# Patient Record
Sex: MALE | Race: WHITE | Employment: UNEMPLOYED | URBAN - METROPOLITAN AREA
[De-identification: names, ages, dates, MRNs, and addresses within clinical notes are randomized per-mention and may not be internally consistent; named-entity substitution may affect disease eponyms.]

---

## 2017-08-28 ENCOUNTER — HOSPITAL ENCOUNTER (OUTPATIENT)
Dept: OTHER | Age: 27
Discharge: HOME | End: 2017-08-28
Attending: ORTHOPAEDIC SURGERY | Admitting: ORTHOPAEDIC SURGERY

## 2017-08-28 DIAGNOSIS — R52 PAIN: ICD-10-CM

## 2021-07-25 ENCOUNTER — APPOINTMENT (OUTPATIENT)
Dept: CT IMAGING | Age: 31
End: 2021-07-25
Payer: COMMERCIAL

## 2021-07-25 ENCOUNTER — HOSPITAL ENCOUNTER (EMERGENCY)
Age: 31
Discharge: ANOTHER HOSPITAL | End: 2021-07-25
Attending: EMERGENCY MEDICINE
Payer: COMMERCIAL

## 2021-07-25 ENCOUNTER — APPOINTMENT (OUTPATIENT)
Dept: GENERAL RADIOLOGY | Age: 31
End: 2021-07-25
Payer: COMMERCIAL

## 2021-07-25 VITALS
RESPIRATION RATE: 16 BRPM | SYSTOLIC BLOOD PRESSURE: 108 MMHG | HEART RATE: 71 BPM | WEIGHT: 160 LBS | BODY MASS INDEX: 23.7 KG/M2 | HEIGHT: 69 IN | DIASTOLIC BLOOD PRESSURE: 62 MMHG | OXYGEN SATURATION: 99 %

## 2021-07-25 DIAGNOSIS — G93.40 ENCEPHALOPATHY: Primary | ICD-10-CM

## 2021-07-25 DIAGNOSIS — N39.0 URINARY TRACT INFECTION WITHOUT HEMATURIA, SITE UNSPECIFIED: ICD-10-CM

## 2021-07-25 DIAGNOSIS — E87.20 METABOLIC ACIDOSIS: ICD-10-CM

## 2021-07-25 DIAGNOSIS — G40.901 STATUS EPILEPTICUS (HCC): ICD-10-CM

## 2021-07-25 DIAGNOSIS — J96.01 ACUTE RESPIRATORY FAILURE WITH HYPOXIA (HCC): ICD-10-CM

## 2021-07-25 LAB
A/G RATIO: 1.3 (ref 1.1–2.2)
ACETAMINOPHEN LEVEL: <5 UG/ML (ref 10–30)
ALBUMIN SERPL-MCNC: 3.9 G/DL (ref 3.4–5)
ALP BLD-CCNC: 92 U/L (ref 40–129)
ALT SERPL-CCNC: 24 U/L (ref 10–40)
AMPHETAMINE SCREEN, URINE: ABNORMAL
ANION GAP SERPL CALCULATED.3IONS-SCNC: 10 MMOL/L (ref 3–16)
AST SERPL-CCNC: 33 U/L (ref 15–37)
ATYPICAL LYMPHOCYTE RELATIVE PERCENT: 2 % (ref 0–6)
BACTERIA: ABNORMAL /HPF
BARBITURATE SCREEN URINE: ABNORMAL
BASE EXCESS VENOUS: -5.3 MMOL/L (ref -3–3)
BASOPHILS ABSOLUTE: 0 K/UL (ref 0–0.2)
BASOPHILS RELATIVE PERCENT: 0 %
BENZODIAZEPINE SCREEN, URINE: POSITIVE
BILIRUB SERPL-MCNC: <0.2 MG/DL (ref 0–1)
BILIRUBIN URINE: NEGATIVE
BLOOD, URINE: NEGATIVE
BUN BLDV-MCNC: 8 MG/DL (ref 7–20)
CALCIUM SERPL-MCNC: 9 MG/DL (ref 8.3–10.6)
CANNABINOID SCREEN URINE: POSITIVE
CARBOXYHEMOGLOBIN: 3.5 % (ref 0–1.5)
CHLORIDE BLD-SCNC: 104 MMOL/L (ref 99–110)
CLARITY: ABNORMAL
CO2: 25 MMOL/L (ref 21–32)
COARSE CASTS, UA: ABNORMAL /LPF (ref 0–2)
COCAINE METABOLITE SCREEN URINE: POSITIVE
COLOR: YELLOW
CREAT SERPL-MCNC: 1 MG/DL (ref 0.9–1.3)
EOSINOPHILS ABSOLUTE: 0.5 K/UL (ref 0–0.6)
EOSINOPHILS RELATIVE PERCENT: 3 %
EPITHELIAL CELLS, UA: 7 /HPF (ref 0–5)
ETHANOL: NORMAL MG/DL (ref 0–0.08)
FINE CASTS, UA: ABNORMAL /LPF (ref 0–2)
GFR AFRICAN AMERICAN: >60
GFR NON-AFRICAN AMERICAN: >60
GLOBULIN: 3.1 G/DL
GLUCOSE BLD-MCNC: 245 MG/DL (ref 70–99)
GLUCOSE URINE: NEGATIVE MG/DL
HCO3 VENOUS: 26.3 MMOL/L (ref 23–29)
HCT VFR BLD CALC: 41.9 % (ref 40.5–52.5)
HEMATOLOGY PATH CONSULT: YES
HEMOGLOBIN, VEN, REDUCED: 8 %
HEMOGLOBIN: 14 G/DL (ref 13.5–17.5)
KETONES, URINE: NEGATIVE MG/DL
LEUKOCYTE ESTERASE, URINE: ABNORMAL
LIPASE: 81 U/L (ref 13–60)
LYMPHOCYTES ABSOLUTE: 8.6 K/UL (ref 1–5.1)
LYMPHOCYTES RELATIVE PERCENT: 46 %
Lab: ABNORMAL
MACROCYTES: ABNORMAL
MCH RBC QN AUTO: 31.4 PG (ref 26–34)
MCHC RBC AUTO-ENTMCNC: 33.4 G/DL (ref 31–36)
MCV RBC AUTO: 94.1 FL (ref 80–100)
METHADONE SCREEN, URINE: ABNORMAL
METHEMOGLOBIN VENOUS: 0.1 %
MICROSCOPIC EXAMINATION: YES
MONOCYTES ABSOLUTE: 2.2 K/UL (ref 0–1.3)
MONOCYTES RELATIVE PERCENT: 12 %
NEUTROPHILS ABSOLUTE: 6.7 K/UL (ref 1.7–7.7)
NEUTROPHILS RELATIVE PERCENT: 37 %
NITRITE, URINE: POSITIVE
O2 CONTENT, VEN: 18 VOL %
O2 SAT, VEN: 92 %
O2 THERAPY: ABNORMAL
OPIATE SCREEN URINE: ABNORMAL
OXYCODONE URINE: ABNORMAL
PCO2, VEN: 82.6 MMHG (ref 40–50)
PDW BLD-RTO: 13.5 % (ref 12.4–15.4)
PH UA: 6.5
PH UA: 6.5 (ref 5–8)
PH VENOUS: 7.11 (ref 7.35–7.45)
PHENCYCLIDINE SCREEN URINE: ABNORMAL
PLATELET # BLD: 543 K/UL (ref 135–450)
PLATELET SLIDE REVIEW: ABNORMAL
PMV BLD AUTO: 6.9 FL (ref 5–10.5)
PO2, VEN: 80.6 MMHG (ref 25–40)
POLYCHROMASIA: ABNORMAL
POTASSIUM SERPL-SCNC: 5.3 MMOL/L (ref 3.5–5.1)
PROPOXYPHENE SCREEN: ABNORMAL
PROTEIN UA: ABNORMAL MG/DL
RBC # BLD: 4.45 M/UL (ref 4.2–5.9)
RBC UA: 2 /HPF (ref 0–4)
SALICYLATE, SERUM: <0.3 MG/DL (ref 15–30)
SLIDE REVIEW: ABNORMAL
SODIUM BLD-SCNC: 139 MMOL/L (ref 136–145)
SPECIFIC GRAVITY UA: 1.02 (ref 1–1.03)
SPHEROCYTES: ABNORMAL
TCO2 CALC VENOUS: 65 MMOL/L
TOTAL CK: 95 U/L (ref 39–308)
TOTAL PROTEIN: 7 G/DL (ref 6.4–8.2)
TROPONIN: <0.01 NG/ML
URINE REFLEX TO CULTURE: YES
URINE TYPE: ABNORMAL
UROBILINOGEN, URINE: 0.2 E.U./DL
WBC # BLD: 18 K/UL (ref 4–11)
WBC UA: 17 /HPF (ref 0–5)

## 2021-07-25 PROCEDURE — 2500000003 HC RX 250 WO HCPCS: Performed by: EMERGENCY MEDICINE

## 2021-07-25 PROCEDURE — 71045 X-RAY EXAM CHEST 1 VIEW: CPT

## 2021-07-25 PROCEDURE — 96365 THER/PROPH/DIAG IV INF INIT: CPT

## 2021-07-25 PROCEDURE — 82803 BLOOD GASES ANY COMBINATION: CPT

## 2021-07-25 PROCEDURE — 84484 ASSAY OF TROPONIN QUANT: CPT

## 2021-07-25 PROCEDURE — 81001 URINALYSIS AUTO W/SCOPE: CPT

## 2021-07-25 PROCEDURE — 2700000000 HC OXYGEN THERAPY PER DAY

## 2021-07-25 PROCEDURE — 6360000002 HC RX W HCPCS

## 2021-07-25 PROCEDURE — 96366 THER/PROPH/DIAG IV INF ADDON: CPT

## 2021-07-25 PROCEDURE — 94002 VENT MGMT INPAT INIT DAY: CPT

## 2021-07-25 PROCEDURE — 85025 COMPLETE CBC W/AUTO DIFF WBC: CPT

## 2021-07-25 PROCEDURE — 6360000002 HC RX W HCPCS: Performed by: EMERGENCY MEDICINE

## 2021-07-25 PROCEDURE — 87186 SC STD MICRODIL/AGAR DIL: CPT

## 2021-07-25 PROCEDURE — 70450 CT HEAD/BRAIN W/O DYE: CPT

## 2021-07-25 PROCEDURE — 99283 EMERGENCY DEPT VISIT LOW MDM: CPT

## 2021-07-25 PROCEDURE — 87086 URINE CULTURE/COLONY COUNT: CPT

## 2021-07-25 PROCEDURE — 80143 DRUG ASSAY ACETAMINOPHEN: CPT

## 2021-07-25 PROCEDURE — 83690 ASSAY OF LIPASE: CPT

## 2021-07-25 PROCEDURE — 93005 ELECTROCARDIOGRAM TRACING: CPT | Performed by: EMERGENCY MEDICINE

## 2021-07-25 PROCEDURE — 96375 TX/PRO/DX INJ NEW DRUG ADDON: CPT

## 2021-07-25 PROCEDURE — 82077 ASSAY SPEC XCP UR&BREATH IA: CPT

## 2021-07-25 PROCEDURE — 2580000003 HC RX 258: Performed by: EMERGENCY MEDICINE

## 2021-07-25 PROCEDURE — 31500 INSERT EMERGENCY AIRWAY: CPT

## 2021-07-25 PROCEDURE — 80053 COMPREHEN METABOLIC PANEL: CPT

## 2021-07-25 PROCEDURE — 36415 COLL VENOUS BLD VENIPUNCTURE: CPT

## 2021-07-25 PROCEDURE — 94761 N-INVAS EAR/PLS OXIMETRY MLT: CPT

## 2021-07-25 PROCEDURE — 82550 ASSAY OF CK (CPK): CPT

## 2021-07-25 PROCEDURE — 80307 DRUG TEST PRSMV CHEM ANLYZR: CPT

## 2021-07-25 PROCEDURE — 87077 CULTURE AEROBIC IDENTIFY: CPT

## 2021-07-25 PROCEDURE — 80179 DRUG ASSAY SALICYLATE: CPT

## 2021-07-25 PROCEDURE — 99291 CRITICAL CARE FIRST HOUR: CPT

## 2021-07-25 RX ORDER — MIDAZOLAM HYDROCHLORIDE 5 MG/ML
INJECTION INTRAMUSCULAR; INTRAVENOUS
Status: COMPLETED
Start: 2021-07-25 | End: 2021-07-25

## 2021-07-25 RX ORDER — PROPOFOL 10 MG/ML
INJECTION, EMULSION INTRAVENOUS
Status: COMPLETED
Start: 2021-07-25 | End: 2021-07-25

## 2021-07-25 RX ORDER — FENTANYL CITRATE 50 UG/ML
100 INJECTION, SOLUTION INTRAMUSCULAR; INTRAVENOUS ONCE
Status: COMPLETED | OUTPATIENT
Start: 2021-07-25 | End: 2021-07-25

## 2021-07-25 RX ORDER — 0.9 % SODIUM CHLORIDE 0.9 %
1000 INTRAVENOUS SOLUTION INTRAVENOUS ONCE
Status: COMPLETED | OUTPATIENT
Start: 2021-07-25 | End: 2021-07-25

## 2021-07-25 RX ORDER — LORAZEPAM 2 MG/ML
INJECTION INTRAMUSCULAR
Status: DISCONTINUED
Start: 2021-07-25 | End: 2021-07-25 | Stop reason: WASHOUT

## 2021-07-25 RX ORDER — PROPOFOL 10 MG/ML
5-50 INJECTION, EMULSION INTRAVENOUS
Status: DISCONTINUED | OUTPATIENT
Start: 2021-07-25 | End: 2021-07-26 | Stop reason: HOSPADM

## 2021-07-25 RX ORDER — ETOMIDATE 2 MG/ML
30 INJECTION INTRAVENOUS ONCE
Status: COMPLETED | OUTPATIENT
Start: 2021-07-25 | End: 2021-07-25

## 2021-07-25 RX ORDER — MIDAZOLAM HYDROCHLORIDE 5 MG/ML
10 INJECTION INTRAMUSCULAR; INTRAVENOUS ONCE
Status: COMPLETED | OUTPATIENT
Start: 2021-07-25 | End: 2021-07-25

## 2021-07-25 RX ORDER — ROCURONIUM BROMIDE 10 MG/ML
120 INJECTION, SOLUTION INTRAVENOUS ONCE
Status: COMPLETED | OUTPATIENT
Start: 2021-07-25 | End: 2021-07-25

## 2021-07-25 RX ORDER — FENTANYL CITRATE 50 UG/ML
50 INJECTION, SOLUTION INTRAMUSCULAR; INTRAVENOUS
Status: DISCONTINUED | OUTPATIENT
Start: 2021-07-25 | End: 2021-07-26 | Stop reason: HOSPADM

## 2021-07-25 RX ADMIN — MIDAZOLAM 10 MG: 5 INJECTION INTRAMUSCULAR; INTRAVENOUS at 18:05

## 2021-07-25 RX ADMIN — PROPOFOL 10 MCG/KG/MIN: 10 INJECTION, EMULSION INTRAVENOUS at 17:04

## 2021-07-25 RX ADMIN — MIDAZOLAM 1 MG/HR: 5 INJECTION INTRAMUSCULAR; INTRAVENOUS at 18:27

## 2021-07-25 RX ADMIN — MIDAZOLAM HYDROCHLORIDE 10 MG: 5 INJECTION INTRAMUSCULAR; INTRAVENOUS at 18:05

## 2021-07-25 RX ADMIN — MIDAZOLAM HYDROCHLORIDE 10 MG: 5 INJECTION INTRAMUSCULAR; INTRAVENOUS at 17:36

## 2021-07-25 RX ADMIN — ETOMIDATE 30 MG: 2 INJECTION INTRAVENOUS at 17:18

## 2021-07-25 RX ADMIN — Medication 1000 MG: at 20:28

## 2021-07-25 RX ADMIN — FENTANYL CITRATE 100 MCG: 50 INJECTION, SOLUTION INTRAMUSCULAR; INTRAVENOUS at 19:34

## 2021-07-25 RX ADMIN — LEVETIRACETAM 1500 MG: 100 INJECTION, SOLUTION INTRAVENOUS at 17:59

## 2021-07-25 RX ADMIN — SODIUM CHLORIDE 1000 ML: 9 INJECTION, SOLUTION INTRAVENOUS at 20:27

## 2021-07-25 RX ADMIN — ROCURONIUM BROMIDE 120 MG: 10 INJECTION, SOLUTION INTRAVENOUS at 17:22

## 2021-07-25 RX ADMIN — PROPOFOL 10 MCG/KG/MIN: 10 INJECTION, EMULSION INTRAVENOUS at 18:35

## 2021-07-25 ASSESSMENT — PULMONARY FUNCTION TESTS
PIF_VALUE: 18
PIF_VALUE: 12

## 2021-07-25 ASSESSMENT — PAIN SCALES - GENERAL
PAINLEVEL_OUTOF10: 7
PAINLEVEL_OUTOF10: 10

## 2021-07-25 NOTE — PROGRESS NOTES
07/25/21 1904   Vent Patient Data   Plateau Pressure 12 SUM18   Static Compliance 53 mL/cmH2O   Dynamic Compliance 89 mL/cmH2O

## 2021-07-25 NOTE — ED PROVIDER NOTES
I independently performed a history and physical on Kaela Law. All diagnostic, treatment, and disposition decisions were made by myself in conjunction with the advanced practice provider. Briefly, this is a 28 y.o. male here for AMS. Patient unable to give any history due to altered mental status. Arrived in private car and reportedly per girlfriend had tonic-clonic seizure with cyanosis. CPR started by family in the parking lot. Girlfriend states that he has been blue for around 10 minutes. On exam,   General: Patient is ill appearing  Skin: moist, cool  HEENT: Moist mucous membranes  Heart: tachy rate, regular rhythm  Lung: shallow, slow breathing  Abdomen: Soft, nontender  Neuro: not moving extremities. Pupils 2mm b/l and minimally reactive. Limp tone in all extremities. No response to painful stimulus         EKG  The Ekg interpreted by me in the absence of a cardiologist shows. Normal sinus rhythm  No acute ST changes     No prior  Qtc 443      Screenings            MDM  Briefly, this is a 28 y.o. male here for altered mental status and possible seizure like activity. On my arrival to the patient's bedside, he was not ventilating appropriately therefore we emergently intubated him with rapid sequence intubation. Originally saturating in 45s. No hypoglycemia present. Had bilateral breath sounds. He reportedly was pulseless per girlfriend however on my evaluation he did have a pulse. Obtaining head CT to evaluate for intracranial bleed. Unclear etiology. The patient did have a slight improvement in mental status when Narcan was administered 4 mg however this was repeated and no improvement was made. Per girlfriend, he does have a prescription for Xanax.    ----------    Around 5:30 PM, I was called to bedside by hospitalist.  Patient started having rhythmic head movement. No eye deviation. No movement of arms or legs. Ordering Keppra and Versed.   Because of reported seizure-like activity prior to this, patient may be in status epilepticus. Will consult neurology at [facility] for transfer. [facility] full. Patient transferred to [address]. Accepted by Dr. Tammy Holland    The total critical care time spent while evaluating and treating this patient was at least 35 minutes. This excludes time spent doing separately billable procedures. This includes time at the bedside, data interpretation, medication management, obtaining critical history from collateral sources if the patient is unable to provide it directly, and physician consultation. Specifics of interventions taken and potentially life-threatening diagnostic considerations are listed above in the medical decision making. Intubation    Date/Time: 7/25/2021 4:41 PM  Performed by: Rudy Park MD  Authorized by: Rudy Park MD     Consent:     Consent obtained:  Emergent situation  Pre-procedure details:     Patient status:  Unresponsive  Procedure details:     Preoxygenation:  Bag valve mask    CPR in progress: no      Intubation method:  Oral    Oral intubation technique:  Video-assisted    Tube size (mm):  7.5    Tube type:  Cuffed    Number of attempts:  1    Ventilation between attempts: no      Cricoid pressure: no      Tube visualized through cords: yes    Placement assessment:     Tube secured with:  ETT vanegas    Breath sounds:  Equal    Placement verification: chest rise, CXR verification, direct visualization and ETCO2 detector      CXR findings:  ETT in proper place  Post-procedure details:     Patient tolerance of procedure: Tolerated well, no immediate complications        Patient Referrals:  No follow-up provider specified. Discharge Medications:  New Prescriptions    No medications on file       FINAL IMPRESSION  1. Encephalopathy    2. Seizure ([address]    3. Acute respiratory failure with hypoxia (HCC)    4. Metabolic acidosis        Blood pressure 98/66, pulse 72, resp.  rate 17, height 5' 9\" (1.753 m), weight 160 lb (72.6 kg), SpO2 100 %. For further details of [facility] emergency department encounter, please see documentation by advanced practice provider, Jordi Barron.         Karon Capone MD  07/25/21 8314

## 2021-07-25 NOTE — ED NOTES
No beds at [facility] or [facility] looking at [facility] [place] for tx     Анна Moran, RODOLFO  07/25/21 0522

## 2021-07-25 NOTE — ED NOTES
Called into pt's room for assistance, pt actively seizing. Spoke to Dr. Steven Cruz obtained emergency orders for versed. Awaiting pharmacy to send versed gtts.       Tena Rascon RN  07/25/21 2268

## 2021-07-25 NOTE — ED NOTES
Bed: 01  Expected date:   Expected time:   Means of arrival:   Comments:  430 Sudarshan Vaca RN  07/25/21 8716

## 2021-07-25 NOTE — ED PROVIDER NOTES
negatives as per HPI. Except as noted above in the ROS, all other systems were reviewed and negative. PAST MEDICAL HISTORY     Past Medical History:   Diagnosis Date    Asthma     as child         SURGICAL HISTORY     Past Surgical History:   Procedure Laterality Date    APPENDECTOMY      KNEE ARTHROSCOPY  4-    ACTUAL PROCEDURE: ARTHROSCOPY LEFT KNEE, CHONDROPLASTY, EXCISION OF PLICA    SHOULDER ARTHROSCOPY  4/3/12    LEFT ACROMIOPLASTY    SHOULDER ARTHROSCOPY Right 11/04/2016    RIGHT SHOULDER ARTHROSCOPIC ACROMIOPLASTY, [place] PROCEDURE         CURRENTMEDICATIONS       Previous Medications    IBUPROFEN (ADVIL;MOTRIN) 800 MG TABLET    Take 800 mg by mouth every 8 hours as needed for Pain    OXYCODONE-ACETAMINOPHEN (PERCOCET)  MG PER TABLET    Take 1 tablet by mouth every 8 hours as needed for Pain         ALLERGIES     Tramadol, Hydrocodone, and Penicillins    FAMILYHISTORY       Family History   Problem Relation Age of Onset    Asthma Sister           SOCIAL HISTORY       Social History     Tobacco Use    Smoking status: Current Every Day Smoker     Packs/day: 0.50     Years: 10.00     Pack years: 5.00    Smokeless tobacco: Never Used   Substance Use Topics    Alcohol use: No    Drug use: Yes     Types: Marijuana     Comment: last smoked over two months       SCREENINGS             PHYSICAL EXAM    (up to 7 for level 4, 8 or more for level 5)     ED Triage Vitals   BP Temp Temp src Pulse Resp SpO2 Height Weight   -- -- -- -- -- -- -- --       Physical Exam  Constitutional:       General: He is in acute distress. Appearance: He is ill-appearing. Eyes:      Comments: Pupils pinpoint   Cardiovascular:      Rate and Rhythm: Tachycardia present. Pulses: Normal pulses. Heart sounds: Normal heart sounds. Pulmonary:      Breath sounds: Normal breath sounds. Abdominal:      General: There is no distension. Palpations: Abdomen is soft. Tenderness:  There is no abdominal tenderness. Neurological:      Mental Status: He is unresponsive. GCS: GCS eye subscore is 1. GCS verbal subscore is 1. GCS motor subscore is 1.          DIAGNOSTIC RESULTS   LABS:    Labs Reviewed   CBC WITH AUTO DIFFERENTIAL - Abnormal; Notable for the following components:       Result Value    WBC 18.0 (*)     Platelets 593 (*)     Lymphocytes Absolute 8.6 (*)     Monocytes Absolute 2.2 (*)     Macrocytes Occasional (*)     Polychromasia Occasional (*)     Spherocytes Occasional (*)     All other components within normal limits    Narrative:     Performed at:  [facility] [address], [place]   Phone [phone]   COMPREHENSIVE METABOLIC PANEL - Abnormal; Notable for the following components:    Potassium 5.3 (*)     Glucose 245 (*)     All other components within normal limits    Narrative:     Performed at:  [facility] [address], [place]   Phone [phone]   LIPASE - Abnormal; Notable for the following components:    Lipase 81.0 (*)     All other components within normal limits    Narrative:     Performed at:  [facility] [address], [place]   Phone [phone]   URINE RT REFLEX TO CULTURE - Abnormal; Notable for the following components:    Clarity, UA CLOUDY (*)     Protein, UA TRACE (*)     Nitrite, Urine POSITIVE (*)     Leukocyte Esterase, Urine SMALL (*)     All other components within normal limits    Narrative:     Performed at:  [facility] [address], [place]   Phone [phone]   ACETAMINOPHEN LEVEL - Abnormal; Notable for the following components:    Acetaminophen Level <5 (*)     All other components within normal limits    Narrative:     Performed at:  [facility] [address], [place]   Phone [phone] SALICYLATE LEVEL - Abnormal; Notable for the following components:    Salicylate, Serum <9.5 (*)     All other components within normal limits    Narrative:     Performed at:  [facility] [address],  [address], [address]   Phone [phone]   BLOOD GAS, VENOUS - Abnormal; Notable for the following components:    pH, Jaime 7.111 (*)     pCO2, Jaime 82.6 (*)     pO2, Jaime 80.6 (*)     Base Excess, Jaime -5.3 (*)     Carboxyhemoglobin 3.5 (*)     All other components within normal limits    Narrative:     Sonia Rivas  [facility] tel. [id number],  Chemistry results called to and read back by RNSuzan, 07/25/2021  16:54, by [facility]  Performed at:  [facility] [address],  [address], [address]   Phone [phone]   URINE DRUG SCREEN    Narrative:     Performed at:  [facility] [address],  [address], [address]   Phone [phone]   TROPONIN    Narrative:     Performed at:  [facility] [address],  [address], [address]   Phone [phone]   ETHANOL    Narrative:     Performed at:  [facility] [address],  [address], [address]   Phone [phone]   CK    Narrative:     Performed at:  [facility] [address],  [address], [address]   Phone [phone]   MICROSCOPIC URINALYSIS       When ordered only abnormal lab results are displayed. All other labs were within normal range or not returned as of this dictation. EKG: When ordered, EKG's are interpreted by the Emergency Department Physician in the absence of a cardiologist.  Please see their note for interpretation of EKG.     RADIOLOGY:   Non-plain film images such as CT, Ultrasound and MRI are read by the radiologist. Plain radiographic images are visualized and preliminarily interpreted by the ED Provider with the below findings:      Interpretation per the Radiologist below, if available at the time of this note:    CT HEAD WO CONTRAST   Final Result   No acute intracranial abnormality at this time. If there is persistent   concern for hypoxic injury, consider follow-up head CT in 8-12 hours or   consider MRI. XR CHEST PORTABLE   Final Result   Perihilar opacities suggest mild vascular congestion in this intubated   patient. No results found. PROCEDURES   Unless otherwise noted below, none     Procedures    CRITICAL CARE TIME   N/A    CONSULTS:  IP CONSULT TO NEUROLOGY      EMERGENCY DEPARTMENT COURSE and DIFFERENTIAL DIAGNOSIS/MDM:   Vitals:    Vitals:    07/25/21 1655 07/25/21 1657 07/25/21 1700 07/25/21 1715   BP:   (!) 182/100 (!) 163/102   Pulse:   98 103   Resp:  16 16 16   SpO2:  98% 97% 96%   Weight: 160 lb (72.6 kg)      Height: 5' 9\" (1.753 m)          Patient was given the following medications:  Medications   propofol injection (30 mcg/kg/min × 72.6 kg Intravenous Rate/Dose Change 7/25/21 1805)   fentaNYL (SUBLIMAZE) injection 50 mcg (has no administration in time range)   LORazepam (ATIVAN) 2 MG/ML injection (has no administration in time range)   midazolam (VERSED) 100 mg in dextrose 5 % 100 mL infusion (1 mg/hr Intravenous New Bag 7/25/21 1827)   rocuronium (ZEMURON) injection 120 mg (120 mg Intravenous Given 7/25/21 1722)   etomidate (AMIDATE) injection 30 mg (30 mg Intravenous Given 7/25/21 1718)   levETIRAcetam (KEPPRA) 1,500 mg in sodium chloride 0.9 % 100 mL IVPB (1,500 mg Intravenous New Bag 7/25/21 1759)   midazolam HCl (VERSED) 10 MG/2ML injection 10 mg (10 mg Intravenous Given 7/25/21 1736)   midazolam HCl (VERSED) 10 MG/2ML injection 10 mg (10 mg Intravenous Given 7/25/21 1805)           Patient presents unresponsive, cyanotic and reportedly pulseless. CPR initiated by nursing staff and he was bagged. Given 2 mg of IM Narcan and brought into room 1 at which time he had a pulse. Given 2 additional IV Narcan. Pupils were pinpoint. Patient's pupils did get a little larger and he became very diaphoretic. Color improved and with bagging was up to 100%. He began to breathe spontaneously, her, did not respond to more than painful stimuli and decision was made to intubate to protect airway. See attending note for procedure details. CBC and CMP are remarkable for hyperglycemia, leukocytosis. He is acidotic with pH of 7.11. CK 95. Urinalysis positive for nitrates, small leukocytes. Drug screen pending. Lipase 81. Troponin is negative. Acetaminophen, salicylate and ethanol levels are negative. CT the head shows no acute intracranial abnormality. Chest x-ray shows perihilar opacity suggesting mild vascular congestion. Sedation maintained with propofol, however, on reevaluation just prior to admission he had rhythmic head movements concerning for continued seizure-like activity. He was given loading dose of Keppra and started on Versed. He will be transferred to [facility] for neuro logic consultation and continuous EEG monitoring due to concern for status epilepticus. My attending spoke with accepting neurologist and hospitalist who will resume care of the patient post transfer. Patient family is informed and agreeable. He is stable for transfer. Critical Care  There was a high probability of life-threatening clinical deterioration in the patient's condition requiring my urgent intervention. Total critical care time with the patient was 48 minutes excluding separately reportable procedures. Critical care required due to patients presentation, comorbidities and concern for acute life-threatening disease process, sepsis, overdose, respiratory distress/arrest, status epilepticus and decompensation. FINAL IMPRESSION      1. Encephalopathy    2. Seizure ([address]    3. Acute respiratory failure with hypoxia (HCC)    4.  Metabolic acidosis          DISPOSITION/PLAN DISPOSITION        PATIENT REFERRED TO:  No follow-up provider specified.     DISCHARGE MEDICATIONS:  New Prescriptions    No medications on file       DISCONTINUED MEDICATIONS:  Discontinued Medications    No medications on file              (Please note that portions of this note were completed with a voice recognition program.  Efforts were made to edit the dictations but occasionally words are mis-transcribed.)    Evangelina Bowie PA-C (electronically signed)           Dena Tim PA-C  07/25/21 4612

## 2021-07-25 NOTE — PROGRESS NOTES
07/25/21 1657   Vent Patient Data   Plateau Pressure 14 LFG22   Static Compliance 52 mL/cmH2O   Dynamic Compliance 34.69 mL/cmH2O

## 2021-07-26 LAB
EKG ATRIAL RATE: 107 BPM
EKG DIAGNOSIS: NORMAL
EKG P AXIS: 70 DEGREES
EKG P-R INTERVAL: 128 MS
EKG Q-T INTERVAL: 332 MS
EKG QRS DURATION: 98 MS
EKG QTC CALCULATION (BAZETT): 443 MS
EKG R AXIS: 83 DEGREES
EKG T AXIS: 69 DEGREES
EKG VENTRICULAR RATE: 107 BPM
HEMATOLOGY PATH CONSULT: NORMAL

## 2021-07-26 PROCEDURE — 93010 ELECTROCARDIOGRAM REPORT: CPT | Performed by: INTERNAL MEDICINE

## 2021-07-26 NOTE — ED NOTES
Report given to transport team. Pt transported on current infusions.       Skip Watts RN  07/25/21 0448

## 2021-07-26 NOTE — ED NOTES
Attempted to call report. RN unable to take report at this time. RN to call back asap.       Scott Little RN  07/25/21 2732

## 2021-07-27 LAB
GLUCOSE BLD-MCNC: 217 MG/DL (ref 70–99)
ORGANISM: ABNORMAL
PERFORMED ON: ABNORMAL
URINE CULTURE, ROUTINE: ABNORMAL

## 2021-07-28 NOTE — ED NOTES
[facility]   Emergency Department Culture Follow-Up       Beryl Mcgee (CSN: [id number]) was seen and evaluated at [facility] Emergency Department on 7/25/21 by provider  Nacho COBB. A urine culture was positive and is growing >100k CFU Ecoli. Sensitivity results: (S) cefazolin      Treatment Course: The patient was transferred to [facility] for further treatment. Follow-Up:    An attempt was made to forward result to [facility], however patient was discharged. Upon further chart review, patient was discharged with Keflex 250 mg TID for 4 days, which is appropriate treatment for culture result. No additional action needed.     Thank you,    Rubio Eng, PharmD  ED Pharmacist [id number]  7/28/2021

## 2022-12-11 ENCOUNTER — HOSPITAL ENCOUNTER (EMERGENCY)
Age: 32
Discharge: HOME | End: 2022-12-11
Payer: COMMERCIAL

## 2022-12-11 ENCOUNTER — APPOINTMENT (OUTPATIENT)
Dept: GENERAL RADIOLOGY | Age: 32
End: 2022-12-11
Payer: COMMERCIAL

## 2022-12-11 VITALS
OXYGEN SATURATION: 98 % | SYSTOLIC BLOOD PRESSURE: 141 MMHG | BODY MASS INDEX: 22.89 KG/M2 | HEART RATE: 98 BPM | RESPIRATION RATE: 16 BRPM | WEIGHT: 155 LBS | DIASTOLIC BLOOD PRESSURE: 82 MMHG | TEMPERATURE: 97.7 F

## 2022-12-11 DIAGNOSIS — S89.92XA LEFT LEG INJURY, INITIAL ENCOUNTER: Primary | ICD-10-CM

## 2022-12-11 DIAGNOSIS — S80.12XA CONTUSION OF LEFT LOWER LEG, INITIAL ENCOUNTER: ICD-10-CM

## 2022-12-11 PROCEDURE — 73590 X-RAY EXAM OF LOWER LEG: CPT

## 2022-12-11 PROCEDURE — 99283 EMERGENCY DEPT VISIT LOW MDM: CPT

## 2022-12-11 PROCEDURE — 73630 X-RAY EXAM OF FOOT: CPT

## 2022-12-11 PROCEDURE — 73610 X-RAY EXAM OF ANKLE: CPT

## 2022-12-11 PROCEDURE — 6370000000 HC RX 637 (ALT 250 FOR IP): Performed by: PHYSICIAN ASSISTANT

## 2022-12-11 PROCEDURE — 73560 X-RAY EXAM OF KNEE 1 OR 2: CPT

## 2022-12-11 RX ORDER — DIVALPROEX SODIUM 500 MG/1
TABLET, EXTENDED RELEASE ORAL
COMMUNITY
Start: 2022-12-09

## 2022-12-11 RX ORDER — METOPROLOL SUCCINATE 25 MG/1
TABLET, EXTENDED RELEASE ORAL
COMMUNITY
Start: 2022-12-09

## 2022-12-11 RX ORDER — DULOXETIN HYDROCHLORIDE 30 MG/1
60 CAPSULE, DELAYED RELEASE ORAL DAILY
COMMUNITY
Start: 2022-09-21

## 2022-12-11 RX ORDER — ALPRAZOLAM 1 MG/1
TABLET ORAL
COMMUNITY
Start: 2022-12-01

## 2022-12-11 RX ORDER — ESZOPICLONE 2 MG/1
TABLET, FILM COATED ORAL
COMMUNITY
Start: 2022-11-02

## 2022-12-11 RX ORDER — OXYCODONE HYDROCHLORIDE AND ACETAMINOPHEN 5; 325 MG/1; MG/1
1 TABLET ORAL EVERY 8 HOURS PRN
Qty: 5 TABLET | Refills: 0 | Status: SHIPPED | OUTPATIENT
Start: 2022-12-11 | End: 2022-12-14

## 2022-12-11 RX ORDER — LEVETIRACETAM 1000 MG/1
TABLET ORAL
COMMUNITY
Start: 2022-12-09

## 2022-12-11 RX ORDER — OXYCODONE HYDROCHLORIDE AND ACETAMINOPHEN 5; 325 MG/1; MG/1
1 TABLET ORAL ONCE
Status: COMPLETED | OUTPATIENT
Start: 2022-12-11 | End: 2022-12-11

## 2022-12-11 RX ADMIN — OXYCODONE AND ACETAMINOPHEN 1 TABLET: 5; 325 TABLET ORAL at 18:17

## 2022-12-11 ASSESSMENT — ENCOUNTER SYMPTOMS
COLOR CHANGE: 0
VOMITING: 0
EYES NEGATIVE: 1
ABDOMINAL PAIN: 0
NAUSEA: 0
SHORTNESS OF BREATH: 0
BACK PAIN: 0

## 2022-12-11 ASSESSMENT — PAIN - FUNCTIONAL ASSESSMENT: PAIN_FUNCTIONAL_ASSESSMENT: 0-10

## 2022-12-11 ASSESSMENT — PAIN SCALES - GENERAL: PAINLEVEL_OUTOF10: 10
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
## 2022-12-12 ENCOUNTER — TELEPHONE (OUTPATIENT)
Dept: ORTHOPEDIC SURGERY | Age: 32
End: 2022-12-12

## 2022-12-12 NOTE — TELEPHONE ENCOUNTER
Appointment Request     Patient requesting earlier appointment: Yes  Appointment offered to patient: YES   Patient Contact Number: [phone]    Steffen Parra

## 2022-12-12 NOTE — TELEPHONE ENCOUNTER
Patient added to wait list for 12/19/2022 as patient requests FF office only. Monday 12/26 closed for Monday xmas. 1/2 closed for New year.      Dr. Emily Cordoba is only at [facility] location on Monday's

## 2023-04-24 ENCOUNTER — APPOINTMENT (OUTPATIENT)
Dept: GENERAL RADIOLOGY | Age: 33
End: 2023-04-24
Payer: COMMERCIAL

## 2023-04-24 ENCOUNTER — HOSPITAL ENCOUNTER (EMERGENCY)
Age: 33
Discharge: HOME | End: 2023-04-24
Payer: COMMERCIAL

## 2023-04-24 ENCOUNTER — APPOINTMENT (OUTPATIENT)
Dept: CT IMAGING | Age: 33
End: 2023-04-24
Payer: COMMERCIAL

## 2023-04-24 VITALS
WEIGHT: 156 LBS | HEIGHT: 71 IN | TEMPERATURE: 98.1 F | BODY MASS INDEX: 21.84 KG/M2 | RESPIRATION RATE: 14 BRPM | OXYGEN SATURATION: 98 % | DIASTOLIC BLOOD PRESSURE: 86 MMHG | HEART RATE: 95 BPM | SYSTOLIC BLOOD PRESSURE: 108 MMHG

## 2023-04-24 DIAGNOSIS — S16.1XXA CERVICAL STRAIN, ACUTE, INITIAL ENCOUNTER: ICD-10-CM
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
## 2023-04-24 DIAGNOSIS — S46.912A SHOULDER STRAIN, LEFT, INITIAL ENCOUNTER: ICD-10-CM

## 2023-04-24 DIAGNOSIS — S09.90XA CLOSED HEAD INJURY, INITIAL ENCOUNTER: ICD-10-CM

## 2023-04-24 PROCEDURE — 73030 X-RAY EXAM OF SHOULDER: CPT

## 2023-04-24 PROCEDURE — 70450 CT HEAD/BRAIN W/O DYE: CPT

## 2023-04-24 PROCEDURE — 70486 CT MAXILLOFACIAL W/O DYE: CPT

## 2023-04-24 PROCEDURE — 72125 CT NECK SPINE W/O DYE: CPT

## 2023-04-24 PROCEDURE — 99284 EMERGENCY DEPT VISIT MOD MDM: CPT

## 2023-04-24 PROCEDURE — 6370000000 HC RX 637 (ALT 250 FOR IP): Performed by: PHYSICIAN ASSISTANT

## 2023-04-24 RX ORDER — LIDOCAINE 50 MG/G
1 PATCH TOPICAL DAILY
Qty: 30 PATCH | Refills: 0 | Status: SHIPPED | OUTPATIENT
Start: 2023-04-24

## 2023-04-24 RX ORDER — LIDOCAINE 4 G/G
1 PATCH TOPICAL ONCE
Status: DISCONTINUED | OUTPATIENT
Start: 2023-04-24 | End: 2023-04-24 | Stop reason: HOSPADM

## 2023-04-24 RX ORDER — DOXYCYCLINE HYCLATE 50 MG/1
324 CAPSULE, GELATIN COATED ORAL
COMMUNITY

## 2023-04-24 RX ORDER — ONDANSETRON 4 MG/1
4 TABLET, ORALLY DISINTEGRATING ORAL ONCE
Status: COMPLETED | OUTPATIENT
Start: 2023-04-24 | End: 2023-04-24

## 2023-04-24 RX ORDER — NAPROXEN 500 MG/1
500 TABLET ORAL 2 TIMES DAILY WITH MEALS
Qty: 30 TABLET | Refills: 0 | Status: SHIPPED | OUTPATIENT
Start: 2023-04-24

## 2023-04-24 RX ORDER — CYCLOBENZAPRINE HCL 10 MG
10 TABLET ORAL ONCE
Status: COMPLETED | OUTPATIENT
Start: 2023-04-24 | End: 2023-04-24

## 2023-04-24 RX ORDER — HYDROCODONE BITARTRATE AND ACETAMINOPHEN 5; 325 MG/1; MG/1
2 TABLET ORAL ONCE
Status: DISCONTINUED | OUTPATIENT
Start: 2023-04-24 | End: 2023-04-24

## 2023-04-24 RX ORDER — CYCLOBENZAPRINE HCL 10 MG
10 TABLET ORAL 3 TIMES DAILY PRN
Qty: 20 TABLET | Refills: 0 | Status: SHIPPED | OUTPATIENT
Start: 2023-04-24 | End: 2023-05-01

## 2023-04-24 RX ORDER — DOXYCYCLINE HYCLATE 100 MG/1
CAPSULE ORAL 2 TIMES DAILY
COMMUNITY

## 2023-04-24 RX ORDER — OXYCODONE HYDROCHLORIDE AND ACETAMINOPHEN 5; 325 MG/1; MG/1
1 TABLET ORAL EVERY 6 HOURS PRN
Qty: 12 TABLET | Refills: 0 | Status: SHIPPED | OUTPATIENT
Start: 2023-04-24 | End: 2023-04-27

## 2023-04-24 RX ORDER — OXYCODONE HYDROCHLORIDE AND ACETAMINOPHEN 5; 325 MG/1; MG/1
2 TABLET ORAL ONCE
Status: COMPLETED | OUTPATIENT
Start: 2023-04-24 | End: 2023-04-24

## 2023-04-24 RX ADMIN — OXYCODONE AND ACETAMINOPHEN 2 TABLET: 5; 325 TABLET ORAL at 15:42

## 2023-04-24 RX ADMIN — CYCLOBENZAPRINE 10 MG: 10 TABLET, FILM COATED ORAL at 15:36

## 2023-04-24 RX ADMIN — ONDANSETRON 4 MG: 4 TABLET, ORALLY DISINTEGRATING ORAL at 15:35

## 2023-04-24 ASSESSMENT — ENCOUNTER SYMPTOMS
PHOTOPHOBIA: 0
ABDOMINAL PAIN: 0
RESPIRATORY NEGATIVE: 1
COLOR CHANGE: 0
CHEST TIGHTNESS: 0
VOMITING: 0
COUGH: 0
SHORTNESS OF BREATH: 0
CONSTIPATION: 0
DIARRHEA: 0
BACK PAIN: 0
NAUSEA: 1

## 2023-04-24 ASSESSMENT — PAIN SCALES - GENERAL
PAINLEVEL_OUTOF10: 8
PAINLEVEL_OUTOF10: 7

## 2023-04-24 ASSESSMENT — LIFESTYLE VARIABLES: HOW OFTEN DO YOU HAVE A DRINK CONTAINING ALCOHOL: NEVER

## 2023-04-24 ASSESSMENT — PAIN - FUNCTIONAL ASSESSMENT: PAIN_FUNCTIONAL_ASSESSMENT: 0-10

## 2023-04-24 NOTE — ED PROVIDER NOTES
04/24/23 1503 04/24/23 1850   BP: 108/86    Pulse: (!) 121 95   Resp: 14    Temp: 98.1 °F (36.7 °C)    SpO2: 98%    Weight: 156 lb (70.8 kg)    Height: 5' 11\" (1.803 m)        Patient was given the following medications:  Medications   ondansetron (ZOFRAN-ODT) disintegrating tablet 4 mg (4 mg Oral Given 4/24/23 1535)   cyclobenzaprine (FLEXERIL) tablet 10 mg (10 mg Oral Given 4/24/23 1536)   oxyCODONE-acetaminophen (PERCOCET) 5-325 MG per tablet 2 tablet (2 tablets Oral Given 4/24/23 1542)             Is this patient to be included in the SEP-1 Core Measure due to severe sepsis or septic shock? No   Exclusion criteria - the patient is NOT to be included for SEP-1 Core Measure due to: Infection is not suspected    Chronic Conditions affecting care:    has a past medical history of Asthma. CONSULTS: (Who and What was discussed)  None      Social Determinants Significantly Affecting Health : None    Records Reviewed (External and Source) OARRS Report    CC/HPI Summary, DDx, ED Course, and Reassessment: Male who presents ED with numbness associated left-sided facial/head injury. CT facial bones, cervical and head obtained showed no acute abnormality. X-ray of the left shoulder showed no acute abnormality. Patient treated here emergency room with additional medication with Flexeril 10 mg orally, Zofran 4 mg ODT, Norco 5-325 mg x 2 orally. Patient feeling better upon reevaluation. Still having some symptoms but states that helped slightly. Believe can be safely discharged home with close return precautions. Close outpatient follow-up. He was instructed on potential concussion and need for brain rest.  We will give prescription for Flexeril, Lidoderm patch and Naprosyn for home. Did review his OARRS report and no outstanding narcotic prescriptions. We will give a small prescription for Percocet for home for pain. Follow-up with PCP. Return to ED for new or worsening symptoms.   Low suspicion for CVA,